# Patient Record
Sex: FEMALE | Race: WHITE | NOT HISPANIC OR LATINO | Employment: FULL TIME | URBAN - METROPOLITAN AREA
[De-identification: names, ages, dates, MRNs, and addresses within clinical notes are randomized per-mention and may not be internally consistent; named-entity substitution may affect disease eponyms.]

---
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
## 2022-01-04 ENCOUNTER — LAB VISIT (OUTPATIENT)
Dept: FAMILY MEDICINE | Facility: CLINIC | Age: 22
End: 2022-01-04
Payer: MEDICAID

## 2022-01-04 DIAGNOSIS — Z11.52 ENCOUNTER FOR SCREENING FOR COVID-19: Primary | ICD-10-CM

## 2022-01-04 PROCEDURE — U0003 INFECTIOUS AGENT DETECTION BY NUCLEIC ACID (DNA OR RNA); SEVERE ACUTE RESPIRATORY SYNDROME CORONAVIRUS 2 (SARS-COV-2) (CORONAVIRUS DISEASE [COVID-19]), AMPLIFIED PROBE TECHNIQUE, MAKING USE OF HIGH THROUGHPUT TECHNOLOGIES AS DESCRIBED BY CMS-2020-01-R: HCPCS | Performed by: FAMILY MEDICINE

## 2022-01-04 PROCEDURE — U0005 INFEC AGEN DETEC AMPLI PROBE: HCPCS | Performed by: FAMILY MEDICINE

## 2022-01-04 NOTE — PROGRESS NOTES
---------- Hannah Byers presented to clinic for COVID-19 swab.   Hannah Byers verified x2, name and  on label    Explained COVID-19 swab procedure to Hannah Byers.   Specimen obtained and label placed on specimen while Hannah Byers was present  Hannah Byers was given time to ask / answer any questions.   Hannah Byers left in satisfactory condition./ N.DAVID Davis

## 2022-01-06 ENCOUNTER — HOSPITAL ENCOUNTER (EMERGENCY)
Facility: HOSPITAL | Age: 22
Discharge: HOME | End: 2022-01-06
Attending: INTERNAL MEDICINE
Payer: MEDICAID

## 2022-01-06 VITALS
BODY MASS INDEX: 39.99 KG/M2 | HEART RATE: 97 BPM | RESPIRATION RATE: 20 BRPM | DIASTOLIC BLOOD PRESSURE: 92 MMHG | TEMPERATURE: 99 F | SYSTOLIC BLOOD PRESSURE: 121 MMHG | WEIGHT: 240 LBS | HEIGHT: 65 IN | OXYGEN SATURATION: 100 %

## 2022-01-06 DIAGNOSIS — U07.1 COVID-19: Primary | ICD-10-CM

## 2022-01-06 LAB
GROUP A STREP, MOLECULAR: NEGATIVE
SARS-COV-2 RDRP RESP QL NAA+PROBE: POSITIVE
SARS-COV-2 RNA RESP QL NAA+PROBE: DETECTED
SARS-COV-2- CYCLE NUMBER: 30

## 2022-01-06 PROCEDURE — 87651 STREP A DNA AMP PROBE: CPT | Performed by: INTERNAL MEDICINE

## 2022-01-06 PROCEDURE — U0002 COVID-19 LAB TEST NON-CDC: HCPCS | Performed by: INTERNAL MEDICINE

## 2022-01-06 PROCEDURE — 99282 EMERGENCY DEPT VISIT SF MDM: CPT | Mod: 25

## 2022-01-06 NOTE — Clinical Note
"Hannah "Shana Byers was seen and treated in our emergency department on 1/6/2022.     COVID-19 is present in our communities across the state. There is limited testing for COVID at this time, so not all patients can be tested. In this situation, your employee meets the following criteria:    Hannah Byers has met the criteria for COVID-19 testing and has a POSITIVE result. She can return to work once they are asymptomatic for 24 hours without the use of fever reducing medications AND at least five days from the first positive result. A mask is recommended for 5 days post quarantine.     If you have any questions or concerns, or if I can be of further assistance, please do not hesitate to contact me.    Sincerely,              RN"

## 2022-01-07 NOTE — ED PROVIDER NOTES
Encounter Date: 1/6/2022       History     Chief Complaint   Patient presents with    Sore Throat     Sore throat for 1 day      Patient comes in with sore throat, runny nose, headache, cough, myalgias, fever chills and sweats.        Review of patient's allergies indicates:   Allergen Reactions    Testosterone cypionate Hives    Cantaloupe Rash     Past Medical History:   Diagnosis Date    Depression     Ovarian cyst     resolved     Past Surgical History:   Procedure Laterality Date    TONSILLECTOMY       Family History   Problem Relation Age of Onset    No Known Problems Mother     No Known Problems Father     No Known Problems Sister     No Known Problems Brother     Heart failure Paternal Grandmother     Congenital heart disease Neg Hx     Pacemaker/defibrilator Neg Hx     Early death Neg Hx     Arrhythmia Neg Hx      Social History     Tobacco Use    Smoking status: Current Every Day Smoker     Types: Vaping with nicotine    Smokeless tobacco: Never Used   Substance Use Topics    Alcohol use: No    Drug use: No     Review of Systems   Constitutional: Positive for chills, diaphoresis and fever.   HENT: Positive for sore throat.    Respiratory: Negative for shortness of breath.    Cardiovascular: Negative for chest pain.   Gastrointestinal: Negative for nausea.   Genitourinary: Negative for dysuria.   Musculoskeletal: Negative for back pain.   Skin: Negative for rash.   Neurological: Negative for weakness.   Hematological: Does not bruise/bleed easily.   All other systems reviewed and are negative.      Physical Exam     Initial Vitals [01/06/22 2015]   BP Pulse Resp Temp SpO2   (!) 121/92 97 20 99.4 °F (37.4 °C) 100 %      MAP       --         Physical Exam    Nursing note and vitals reviewed.  Constitutional: She appears well-developed and well-nourished.   Pulmonary/Chest: Breath sounds normal.           ED Course   Procedures  Labs Reviewed   SARS-COV-2 RNA AMPLIFICATION, QUAL - Abnormal;  Notable for the following components:       Result Value    SARS-CoV-2 RNA, Amplification, Qual Positive (*)     All other components within normal limits    Narrative:     Is the patient symptomatic?->Yes   GROUP A STREP, MOLECULAR          Imaging Results    None          Medications - No data to display  Medical Decision Making:   ED Management:  Patient's laboratory was unremarkable.  COVID was positive.  Group a strep was negative.  Patient was informed has COVID.  Min and symptomatic remedies were discussed.  No formal treatment recommended other than over-the-counter medications.  Aspirin 2 tablets 3 times a day with Pepcid 3 times a day is indicated                      Clinical Impression:   Final diagnoses:  [U07.1] COVID-19 (Primary)          ED Disposition Condition    Discharge Stable        ED Prescriptions     None        Follow-up Information    None          Steven Person MD  01/07/22 0604

## 2024-11-27 ENCOUNTER — HOSPITAL ENCOUNTER (EMERGENCY)
Facility: HOSPITAL | Age: 24
Discharge: HOME | End: 2024-11-27
Attending: STUDENT IN AN ORGANIZED HEALTH CARE EDUCATION/TRAINING PROGRAM
Payer: MEDICAID

## 2024-11-27 VITALS
SYSTOLIC BLOOD PRESSURE: 138 MMHG | HEART RATE: 98 BPM | TEMPERATURE: 99 F | OXYGEN SATURATION: 98 % | DIASTOLIC BLOOD PRESSURE: 82 MMHG | RESPIRATION RATE: 20 BRPM | WEIGHT: 240 LBS | HEIGHT: 63 IN | BODY MASS INDEX: 42.52 KG/M2

## 2024-11-27 DIAGNOSIS — R19.7 DIARRHEA, UNSPECIFIED TYPE: ICD-10-CM

## 2024-11-27 DIAGNOSIS — J10.1 INFLUENZA A: Primary | ICD-10-CM

## 2024-11-27 LAB
B-HCG UR QL: NEGATIVE
INFLUENZA A, MOLECULAR: POSITIVE
INFLUENZA B, MOLECULAR: NEGATIVE
SARS-COV-2 RDRP RESP QL NAA+PROBE: NEGATIVE
SPECIMEN SOURCE: ABNORMAL

## 2024-11-27 PROCEDURE — 87502 INFLUENZA DNA AMP PROBE: CPT | Performed by: STUDENT IN AN ORGANIZED HEALTH CARE EDUCATION/TRAINING PROGRAM

## 2024-11-27 PROCEDURE — 99283 EMERGENCY DEPT VISIT LOW MDM: CPT

## 2024-11-27 PROCEDURE — 87635 SARS-COV-2 COVID-19 AMP PRB: CPT | Performed by: STUDENT IN AN ORGANIZED HEALTH CARE EDUCATION/TRAINING PROGRAM

## 2024-11-27 PROCEDURE — 81025 URINE PREGNANCY TEST: CPT | Performed by: STUDENT IN AN ORGANIZED HEALTH CARE EDUCATION/TRAINING PROGRAM

## 2024-11-27 PROCEDURE — 25000003 PHARM REV CODE 250: Performed by: STUDENT IN AN ORGANIZED HEALTH CARE EDUCATION/TRAINING PROGRAM

## 2024-11-27 RX ORDER — LOPERAMIDE HYDROCHLORIDE 2 MG/1
2 CAPSULE ORAL 4 TIMES DAILY PRN
Qty: 20 CAPSULE | Refills: 0 | Status: SHIPPED | OUTPATIENT
Start: 2024-11-27

## 2024-11-27 RX ORDER — ACETAMINOPHEN 500 MG
1000 TABLET ORAL
Status: DISCONTINUED | OUTPATIENT
Start: 2024-11-27 | End: 2024-11-27

## 2024-11-27 RX ORDER — IBUPROFEN 600 MG/1
600 TABLET ORAL
Status: COMPLETED | OUTPATIENT
Start: 2024-11-27 | End: 2024-11-27

## 2024-11-27 RX ORDER — OSELTAMIVIR PHOSPHATE 75 MG/1
75 CAPSULE ORAL 2 TIMES DAILY
Qty: 10 CAPSULE | Refills: 0 | Status: SHIPPED | OUTPATIENT
Start: 2024-11-27 | End: 2024-12-02

## 2024-11-27 RX ADMIN — IBUPROFEN 600 MG: 600 TABLET ORAL at 09:11

## 2024-11-27 NOTE — DISCHARGE INSTRUCTIONS
Increase fluid intake    May take Imodium as prescribed for excessive diarrhea    Take Tamiflu as prescribed

## 2024-11-27 NOTE — Clinical Note
"Hannah Corado"Modesta was seen and treated in our emergency department on 11/27/2024.  She may return to work on 12/02/2024.       If you have any questions or concerns, please don't hesitate to call.      Karel Rivera MD"

## 2024-11-27 NOTE — ED PROVIDER NOTES
Encounter Date: 11/27/2024       History     Chief Complaint   Patient presents with    General Illness     Patient states started with body aches, vomiting, diarrhea, nasal congestion, and fever that started yesterday.  Patient states took excedrin x 2 at 0830.       24-year-old female significant history of depression.  She presents to ED with chief complaints of 1 day history of body aches, fever, chills, diarrhea, headaches. Roommate tested positive for COVID yesterday.  She denies associated shortness of breath, chest pain, palpitations.    The history is provided by the patient. No  was used.     Review of patient's allergies indicates:   Allergen Reactions    Testosterone cypionate Hives    Cantaloupe Rash     Past Medical History:   Diagnosis Date    Depression     Ovarian cyst     resolved     Past Surgical History:   Procedure Laterality Date    TONSILLECTOMY       Family History   Problem Relation Name Age of Onset    No Known Problems Mother      No Known Problems Father      No Known Problems Sister      No Known Problems Brother 3     Heart failure Paternal Grandmother      Congenital heart disease Neg Hx      Pacemaker/defibrilator Neg Hx      Early death Neg Hx      Arrhythmia Neg Hx       Social History     Tobacco Use    Smoking status: Every Day     Types: Vaping with nicotine    Smokeless tobacco: Never   Substance Use Topics    Alcohol use: No    Drug use: No     Review of Systems   Constitutional:  Positive for chills, fatigue and fever.   HENT: Negative.     Eyes: Negative.    Respiratory: Negative.     Cardiovascular: Negative.    Gastrointestinal: Negative.    Endocrine: Negative.    Genitourinary: Negative.    Musculoskeletal:  Positive for myalgias.   Skin: Negative.    Neurological:  Positive for headaches.   Hematological: Negative.    Psychiatric/Behavioral: Negative.     All other systems reviewed and are negative.      Physical Exam     Initial Vitals [11/27/24  0934]   BP Pulse Resp Temp SpO2   (!) 138/91 (!) 112 16 (!) 101 °F (38.3 °C) 96 %      MAP       --         Physical Exam    Nursing note and vitals reviewed.  Constitutional: She appears well-developed.   Nontoxic but uncomfortable appearing   HENT:   Head: Normocephalic.   Eyes: Pupils are equal, round, and reactive to light.   Neck:   Normal range of motion.  Cardiovascular:            Tachycardic   Pulmonary/Chest: Breath sounds normal. No respiratory distress.   Abdominal: Abdomen is soft. Bowel sounds are normal. She exhibits no distension. There is no abdominal tenderness. There is no rebound and no guarding.   Musculoskeletal:      Cervical back: Normal range of motion.     Lymphadenopathy:     She has no cervical adenopathy.   Neurological: She is alert and oriented to person, place, and time. She has normal strength. No cranial nerve deficit. GCS score is 15. GCS eye subscore is 4. GCS verbal subscore is 5. GCS motor subscore is 6.   Skin: Skin is warm. Capillary refill takes less than 2 seconds.   Psychiatric: She has a normal mood and affect.         ED Course   Procedures  Labs Reviewed   INFLUENZA A & B BY MOLECULAR - Abnormal       Result Value    Influenza A, Molecular Positive (*)     Influenza B, Molecular Negative      Flu A & B Source Nasal Swab     SARS-COV-2 RNA AMPLIFICATION, QUAL    SARS-CoV-2 RNA, Amplification, Qual Negative     PREGNANCY TEST, URINE RAPID          Imaging Results    None          Medications   ibuprofen tablet 600 mg (600 mg Oral Given 11/27/24 0953)     Medical Decision Making  24-year-old female with viral syndrome.   She was uncomfortable but nontoxic appearing  Influenza a is positive  Rx Tamiflu, advised to increase fluid intake  Patient was seen and reevaluated. Patient's symptoms seem to be stable. I discussed the patient's diagnosis, treatment plan, and plan for discharge with the patient. Patient was instructed to follow up with PCP and was given strict return  precautions to the ED. The patient voiced understanding and agreed with the plan      Risk  Prescription drug management.                                      Clinical Impression:  Final diagnoses:  [J10.1] Influenza A (Primary)  [R19.7] Diarrhea, unspecified type          ED Disposition Condition    Discharge Stable          ED Prescriptions       Medication Sig Dispense Start Date End Date Auth. Provider    oseltamivir (TAMIFLU) 75 MG capsule Take 1 capsule (75 mg total) by mouth 2 (two) times daily. for 5 days 10 capsule 11/27/2024 12/2/2024 Karel Rivera MD    loperamide (IMODIUM) 2 mg capsule Take 1 capsule (2 mg total) by mouth 4 (four) times daily as needed for Diarrhea. 20 capsule 11/27/2024 -- Karel Rivera MD          Follow-up Information       Follow up With Specialties Details Why Contact Info    Camila Caruso MD Pediatrics  As needed [address] [place] [address]  [address]  [place] [address]  [phone]               Karel Rivera MD  11/27/24 1010